# Patient Record
Sex: FEMALE
[De-identification: names, ages, dates, MRNs, and addresses within clinical notes are randomized per-mention and may not be internally consistent; named-entity substitution may affect disease eponyms.]

---

## 2024-02-28 ENCOUNTER — APPOINTMENT (OUTPATIENT)
Dept: OTOLARYNGOLOGY | Facility: CLINIC | Age: 29
End: 2024-02-28
Payer: MEDICAID

## 2024-02-28 DIAGNOSIS — Z82.49 FAMILY HISTORY OF ISCHEMIC HEART DISEASE AND OTHER DISEASES OF THE CIRCULATORY SYSTEM: ICD-10-CM

## 2024-02-28 DIAGNOSIS — Z78.9 OTHER SPECIFIED HEALTH STATUS: ICD-10-CM

## 2024-02-28 DIAGNOSIS — Z82.5 FAMILY HISTORY OF ASTHMA AND OTHER CHRONIC LOWER RESPIRATORY DISEASES: ICD-10-CM

## 2024-02-28 DIAGNOSIS — H69.92 UNSPECIFIED EUSTACHIAN TUBE DISORDER, LEFT EAR: ICD-10-CM

## 2024-02-28 DIAGNOSIS — H93.292 OTHER ABNORMAL AUDITORY PERCEPTIONS, LEFT EAR: ICD-10-CM

## 2024-02-28 PROBLEM — Z00.00 ENCOUNTER FOR PREVENTIVE HEALTH EXAMINATION: Status: ACTIVE | Noted: 2024-02-28

## 2024-02-28 PROCEDURE — 99203 OFFICE O/P NEW LOW 30 MIN: CPT | Mod: 25

## 2024-02-28 PROCEDURE — 92557 COMPREHENSIVE HEARING TEST: CPT

## 2024-02-28 PROCEDURE — 92567 TYMPANOMETRY: CPT

## 2024-02-28 PROCEDURE — 92504 EAR MICROSCOPY EXAMINATION: CPT

## 2024-02-28 RX ORDER — LORATADINE 5 MG/5 ML
SOLUTION, ORAL ORAL
Refills: 0 | Status: ACTIVE | COMMUNITY

## 2024-02-28 NOTE — HISTORY OF PRESENT ILLNESS
[de-identified] : NORBERT MONTILLA is a 28 year old patient Referred by urgent care for evaluation for a left ear infection.  She said that she was seen for left ear fullness and swollen lymph node on February 12.  She was placed on amoxicillin.  She then had the ear irrigated.  She was placed on antibiotic eardrops.  She did not have a preceding upper respiratory tract infection or allergies.  She finished the medications.  She has occasional crackling/eustachian tube dysfunction.  She has no otalgia, otorrhea, or dizziness.  She does have mild bilateral tinnitus.  She has a history of recurrent ear infections in the past. No history of ear surgery or ear trauma No history of excessive noise exposure No nasal or throat symptoms.

## 2024-02-28 NOTE — ASSESSMENT
[FreeTextEntry1] : She had a recent left ear infection.  She has had abnormal perception and a sensation of eustachian tube dysfunction.  She had a small piece of wax on the left tympanic membrane which was removed.  That may have been causing her symptoms.  She may also have eustachian tube dysfunction from the recent infection.  Her ear exam was otherwise normal.  Audiogram was also normal  PLAN  -findings and management options discussed in detail with the patient.  -good aural hygiene -avoid using cotton swabs in the ears -wax removal drops as needed.  -noise precautions -annual audiogram as needed -She may try nasal steroid spray and decongestant for eustachian tube dysfunction -follow up if she has persistent symptoms or any other concerns

## 2024-02-28 NOTE — PHYSICAL EXAM
[TextEntry] : PHYSICAL EXAM  General: The patient was alert, oriented and in no distress. Voice was clear.  Face: The patient had no facial asymmetry or mass. The skin was unremarkable.  Ears: Hearing normal to conversational voice External ears were normal without deformity. Ear canals: Right ear-clear. No cerumen or inflammation Tympanic membranes: Right ear-intact and normal. No perforation or effusion. mobile  Ear canals- see microscopy  Procedure: Microscopic Ear Exam The patient was positioned comfortably.  The microscope was used.  Left ear:  Ear canal-no cerumen or inflammation Tympanic membrane- intact with no inflammation, perforation or effusion.  There is a small piece of wax on the tympanic membrane which was removed atraumatically with a suction.  Nose:  The external nose had no significant deformity.     On anterior rhinoscopy, the nasal mucosa was clear.   The anterior septum was grossly midline. There were no visualized polyps, purulence  or masses.   Oral cavity: Oral mucosa- normal. Oral and base of tongue- clear and without mass. Gingival and buccal mucosa- moist and without lesions. Palate- the palate moved well. There was no cleft palate. There appeared to be good salivary flow.   Oral cavity/oropharynx- no pus, erythema or mass  Neck:  The neck was symmetrical. The parotid and submandibular glands were normal without masses. The trachea was midline and there was no unusual crepitus. Thyroid was smooth and nontender and no masses were palpated. No masses  Lymphatics: Cervical adenopathy- none.    AUDIOGRAM- test ordered and the results reviewed and discussed with the patient. [] Hearing-normal Word recognition-normal Tympanograms- AD- type A,  AS- type A

## 2024-05-13 ENCOUNTER — APPOINTMENT (OUTPATIENT)
Dept: OTOLARYNGOLOGY | Facility: CLINIC | Age: 29
End: 2024-05-13
Payer: MEDICAID

## 2024-05-13 DIAGNOSIS — H93.8X3 OTHER SPECIFIED DISORDERS OF EAR, BILATERAL: ICD-10-CM

## 2024-05-13 DIAGNOSIS — H92.10 OTORRHEA, UNSPECIFIED EAR: ICD-10-CM

## 2024-05-13 PROCEDURE — 99213 OFFICE O/P EST LOW 20 MIN: CPT

## 2024-05-13 NOTE — ASSESSMENT
[FreeTextEntry1] : She has a 2-month history of intermittent ear pressure with drainage.  Her ears were normal on exam today.  There was only a small piece of wax on the right side which was removed.  There was no infection, drainage, or inflammation seen.  The tympanic membranes were normal.  Plan -Findings and management options were discussed with the patient.  I discussed possible etiologies of ear pressure/sensation of eustachian tube dysfunction including allergies, sinus or throat problems, cerumen impaction/ear infections, and TMJ dysfunction. - Good ear hygiene reviewed. - Continue to avoid using cotton swabs in the ears - She may try swimmers eardrops if she feels that her ears are wet.  She also was wondering about using wax removal drops.  If she has wax buildup, she may - I spoke with her about performing nasal endoscopy and flexible laryngoscopy for evaluation for the ear pressure.  She declined today.  I also spoke with her about obtaining an ultrasound if she feels she has swollen lymph nodes.  I did not palpate an obvious lymph node on exam.  She is going to hold off on this for now - I am going to see her back if she has persistent symptoms or any other problems

## 2024-05-13 NOTE — HISTORY OF PRESENT ILLNESS
[de-identified] : NORBERT MONTILLA is a 28 year old patient Here with a 2-month history of intermittent ear fullness and leaking.  It typically occurs at night.  She has no otalgia.  She feels like she has a swollen lymph node whenever she has the pressure in the ears.  She was last seen in February following a left ear infection.  The symptoms from that had resolved.  She does suffer from allergies and that may be causing some of the ear pressure   ENT HIstory She has a history of recurrent ear infections in the past. No history of ear surgery or ear trauma No history of excessive noise exposure No nasal or throat symptoms other than mild allergies no history of smoking denies TMJD

## 2024-05-13 NOTE — PHYSICAL EXAM
[TextEntry] : General: The patient was alert, oriented and in no distress. Voice was clear.  Face: The patient had no facial asymmetry or mass. The skin was unremarkable.  Ears: Hearing normal to conversational voice External ears were normal without deformity.  Ear canals- see microscopy  Procedure: Microscopic Ear Exam The patient was positioned comfortably.  The microscope was used.  Left ear:  Ear canal-no cerumen, inflammation, or drainage Tympanic membrane- intact with no inflammation, perforation or effusion  Right ear: Ear canal-scant cerumen near the meatus which was removed atraumatically with a curette.  No inflammation or drainage Tympanic membrane- intact with no inflammation, perforation or effusion   Nose: The external nose had no significant deformity. On anterior rhinoscopy, the nasal mucosa was clear. The anterior septum was grossly midline. There were no visualized polyps, purulence or masses.  Oral cavity: Oral mucosa- normal. Oral and base of tongue- clear and without mass. Gingival and buccal mucosa- moist and without lesions. Palate- the palate moved well. There was no cleft palate. There appeared to be good salivary flow. Oral cavity/oropharynx- no pus, erythema or mass  Neck: The neck was symmetrical. The parotid and submandibular glands were normal without masses. The trachea was midline and there was no unusual crepitus. Thyroid was smooth and nontender and no masses were palpated. No masses  Lymphatics: Cervical adenopathy- none.

## 2024-05-14 ENCOUNTER — APPOINTMENT (OUTPATIENT)
Dept: OTOLARYNGOLOGY | Facility: CLINIC | Age: 29
End: 2024-05-14

## 2024-09-23 ENCOUNTER — APPOINTMENT (OUTPATIENT)
Dept: OTOLARYNGOLOGY | Facility: CLINIC | Age: 29
End: 2024-09-23
Payer: MEDICAID

## 2024-09-23 DIAGNOSIS — H69.91 UNSPECIFIED EUSTACHIAN TUBE DISORDER, RIGHT EAR: ICD-10-CM

## 2024-09-23 DIAGNOSIS — H61.21 IMPACTED CERUMEN, RIGHT EAR: ICD-10-CM

## 2024-09-23 DIAGNOSIS — H93.8X1 OTHER SPECIFIED DISORDERS OF RIGHT EAR: ICD-10-CM

## 2024-09-23 PROCEDURE — 99213 OFFICE O/P EST LOW 20 MIN: CPT | Mod: 25

## 2024-09-23 NOTE — HISTORY OF PRESENT ILLNESS
[de-identified] : NORBERT MONTILLA is a 28 year old patient Here for recurrent right ear fullness and sensation of eustachian tube dysfunction.  She feels like she may have wax buildup.  She was doing well following her last visit.  Her symptoms had improved.  She does suffer from mild allergies.  She is going to be traveling soon and wished to have her ears checked.  She had also felt like she had a prominent lymph node.  That has resolved.  ENT HIstory She has a history of recurrent ear infections in the past. No history of ear surgery or ear trauma No history of excessive noise exposure No nasal or throat symptoms other than mild allergies no history of smoking denies TMJD she has mild allergies

## 2024-09-23 NOTE — PHYSICAL EXAM
[TextEntry] : General: The patient was alert, oriented and in no distress. Voice was clear.  Face: The patient had no facial asymmetry or mass. The skin was unremarkable.  Ears: Hearing normal to conversational voice External ears were normal without deformity.   PROCEDURE- EAR MICROSCOPY AND CERUMEN REMOVAL from right ear  Indication: cerumen removal Under the microscope, obstructing cerumen was removed atraumatically from the right ear with a suction, curette and/or forceps.  Canal: normal. no inflammation.  Tympanic membrane: Intact. no perforation or effusion.  Left ear: Ear canal-scant cerumen is removed atraumatically with a forceps.  No inflammation Tympanic membrane- intact with no inflammation, perforation or effusion  Tympanograms-right ear-A (borderline As).  Left ear-A  Nose: The external nose had no significant deformity. On anterior rhinoscopy, the nasal mucosa was clear. The anterior septum was grossly midline. There were no visualized polyps, purulence or masses.  Oral cavity: Oral mucosa- normal. Oral and base of tongue- clear and without mass. Gingival and buccal mucosa- moist and without lesions. Palate- the palate moved well. There was no cleft palate. There appeared to be good salivary flow. Oral cavity/oropharynx- no pus, erythema or mass  Neck: The neck was symmetrical. The parotid and submandibular glands were normal without masses. The trachea was midline and there was no unusual crepitus. Thyroid was smooth and nontender and no masses were palpated. No masses  Lymphatics: Cervical adenopathy- none.
Quality 130: Documentation Of Current Medications In The Medical Record: Current Medications Documented
Detail Level: Detailed
Quality 226: Preventive Care And Screening: Tobacco Use: Screening And Cessation Intervention: Patient screened for tobacco use and is an ex/non-smoker

## 2024-09-23 NOTE — ASSESSMENT
[FreeTextEntry1] : She has had right ear pressure and sensation of eustachian dysfunction.  She had cerumen impaction on the side which was removed.  She felt better afterwards.  There was no infection.  Repeat tympanograms showed a borderline A/As tympanogram on the right side.  There was no infection or middle ear effusion.  She does suffer from mild allergies  Plan -Findings and management options were discussed with the patient.  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  She may take a decongestant and frequently Valsalva - Allergy and sinus medications as needed - Treated for nasal endoscopy and flexible laryngoscopy since her symptoms had improved - Follow-up as needed to check her ears.

## 2024-10-07 ENCOUNTER — APPOINTMENT (OUTPATIENT)
Dept: OTOLARYNGOLOGY | Facility: CLINIC | Age: 29
End: 2024-10-07
Payer: MEDICAID

## 2024-10-07 DIAGNOSIS — H69.91 UNSPECIFIED EUSTACHIAN TUBE DISORDER, RIGHT EAR: ICD-10-CM

## 2024-10-07 PROCEDURE — 99213 OFFICE O/P EST LOW 20 MIN: CPT
